# Patient Record
Sex: MALE | Race: WHITE
[De-identification: names, ages, dates, MRNs, and addresses within clinical notes are randomized per-mention and may not be internally consistent; named-entity substitution may affect disease eponyms.]

---

## 2019-11-11 ENCOUNTER — HOSPITAL ENCOUNTER (INPATIENT)
Dept: HOSPITAL 90 - EDH | Age: 74
LOS: 4 days | Discharge: HOSPICE | DRG: 871 | End: 2019-11-15
Attending: INTERNAL MEDICINE | Admitting: INTERNAL MEDICINE
Payer: OTHER GOVERNMENT

## 2019-11-11 VITALS — DIASTOLIC BLOOD PRESSURE: 44 MMHG | SYSTOLIC BLOOD PRESSURE: 79 MMHG

## 2019-11-11 VITALS — HEIGHT: 69 IN | BODY MASS INDEX: 28.88 KG/M2 | WEIGHT: 195 LBS

## 2019-11-11 VITALS — DIASTOLIC BLOOD PRESSURE: 47 MMHG | SYSTOLIC BLOOD PRESSURE: 81 MMHG

## 2019-11-11 VITALS — DIASTOLIC BLOOD PRESSURE: 49 MMHG | SYSTOLIC BLOOD PRESSURE: 88 MMHG

## 2019-11-11 DIAGNOSIS — J44.9: ICD-10-CM
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
## 2019-11-11 DIAGNOSIS — Z88.6: ICD-10-CM

## 2019-11-11 DIAGNOSIS — I10: ICD-10-CM

## 2019-11-11 DIAGNOSIS — E43: ICD-10-CM

## 2019-11-11 DIAGNOSIS — R65.21: ICD-10-CM

## 2019-11-11 DIAGNOSIS — A09: ICD-10-CM

## 2019-11-11 DIAGNOSIS — E11.9: ICD-10-CM

## 2019-11-11 DIAGNOSIS — C25.9: ICD-10-CM

## 2019-11-11 DIAGNOSIS — R50.81: ICD-10-CM

## 2019-11-11 DIAGNOSIS — E87.6: ICD-10-CM

## 2019-11-11 DIAGNOSIS — R62.7: ICD-10-CM

## 2019-11-11 DIAGNOSIS — T45.1X5A: ICD-10-CM

## 2019-11-11 DIAGNOSIS — A41.9: Primary | ICD-10-CM

## 2019-11-11 DIAGNOSIS — Z51.5: ICD-10-CM

## 2019-11-11 DIAGNOSIS — Z88.8: ICD-10-CM

## 2019-11-11 DIAGNOSIS — E78.5: ICD-10-CM

## 2019-11-11 DIAGNOSIS — E86.0: ICD-10-CM

## 2019-11-11 DIAGNOSIS — Z80.0: ICD-10-CM

## 2019-11-11 DIAGNOSIS — D64.81: ICD-10-CM

## 2019-11-11 DIAGNOSIS — K12.1: ICD-10-CM

## 2019-11-11 DIAGNOSIS — Z80.41: ICD-10-CM

## 2019-11-11 DIAGNOSIS — F32.9: ICD-10-CM

## 2019-11-11 DIAGNOSIS — E78.00: ICD-10-CM

## 2019-11-11 DIAGNOSIS — D69.59: ICD-10-CM

## 2019-11-11 DIAGNOSIS — Z82.49: ICD-10-CM

## 2019-11-11 DIAGNOSIS — E86.1: ICD-10-CM

## 2019-11-11 DIAGNOSIS — Z87.01: ICD-10-CM

## 2019-11-11 DIAGNOSIS — K92.2: ICD-10-CM

## 2019-11-11 DIAGNOSIS — Z87.891: ICD-10-CM

## 2019-11-11 DIAGNOSIS — D61.810: ICD-10-CM

## 2019-11-11 LAB
ALBUMIN SERPL-MCNC: 2.1 G/DL (ref 3.5–5)
ALT SERPL-CCNC: 110 U/L (ref 12–78)
APTT PPP: 44.7 SEC (ref 26.3–35.5)
AST SERPL-CCNC: 53 U/L (ref 10–37)
BASOPHILS NFR BLD AUTO: 0.3 % (ref 0–5)
BILIRUB DIRECT SERPL-MCNC: 0.6 MG/DL (ref 0–0.3)
BILIRUB SERPL-MCNC: 1.3 MG/DL (ref 0.2–1)
BUN SERPL-MCNC: 34 MG/DL (ref 7–18)
CHLORIDE SERPL-SCNC: 101 MMOL/L (ref 101–111)
CK SERPL-CCNC: 45 U/L (ref 21–232)
CO2 SERPL-SCNC: 28 MMOL/L (ref 21–32)
CREAT SERPL-MCNC: 1.1 MG/DL (ref 0.5–1.5)
DEPRECATED SQUAMOUS URNS QL MICRO: (no result) /HPF (ref 0–2)
EOSINOPHIL NFR BLD AUTO: 1.3 % (ref 0–8)
ERYTHROCYTE [DISTWIDTH] IN BLOOD BY AUTOMATED COUNT: 13 % (ref 11–15.5)
GFR SERPL CREATININE-BSD FRML MDRD: 70 ML/MIN (ref 60–?)
GLUCOSE SERPL-MCNC: 263 MG/DL (ref 70–105)
GLUCOSE UR STRIP-MCNC: (no result) MG/DL
HCT VFR BLD AUTO: 31.4 % (ref 42–54)
INR PPP: 1.44 (ref 0.85–1.15)
LIPASE SERPL-CCNC: < 50 U/L (ref 114–286)
LYMPHOCYTES NFR SPEC AUTO: 32.1 % (ref 21–51)
MCH RBC QN AUTO: 29.6 PG (ref 27–33)
MCHC RBC AUTO-ENTMCNC: 34 G/DL (ref 32–36)
MCV RBC AUTO: 87.3 FL (ref 79–99)
MONOCYTES NFR BLD AUTO: 4.6 % (ref 3–13)
NEUTROPHILS NFR BLD AUTO: 61.7 % (ref 40–77)
NRBC BLD MANUAL-RTO: 0.2 % (ref 0–0.19)
PH UR STRIP: 5.5 [PH] (ref 5–8)
PLAT MORPH BLD: (no result)
PLATELET # BLD AUTO: 104 K/UL (ref 130–400)
POTASSIUM SERPL-SCNC: 4.9 MMOL/L (ref 3.5–5.1)
PROT SERPL-MCNC: 5.8 G/DL (ref 6–8.3)
PROT UR QL STRIP: (no result) MG/DL
PROTHROMBIN TIME: 14.9 SEC (ref 9.6–11.6)
RBC # BLD AUTO: 3.59 MIL/UL (ref 4.5–6.2)
RBC #/AREA URNS HPF: (no result) /HPF (ref 0–1)
SODIUM SERPL-SCNC: 136 MMOL/L (ref 136–145)
SP GR UR STRIP: 1.03 (ref 1–1.03)
UROBILINOGEN UR STRIP-MCNC: 1 MG/DL (ref 0.2–1)
WBC # BLD AUTO: 0.3 K/UL (ref 4.8–10.8)
WBC #/AREA URNS HPF: (no result) /HPF (ref 0–1)

## 2019-11-11 PROCEDURE — 87040 BLOOD CULTURE FOR BACTERIA: CPT

## 2019-11-11 PROCEDURE — 83690 ASSAY OF LIPASE: CPT

## 2019-11-11 PROCEDURE — 87633 RESP VIRUS 12-25 TARGETS: CPT

## 2019-11-11 PROCEDURE — 85025 COMPLETE CBC W/AUTO DIFF WBC: CPT

## 2019-11-11 PROCEDURE — 87493 C DIFF AMPLIFIED PROBE: CPT

## 2019-11-11 PROCEDURE — 87581 M.PNEUMON DNA AMP PROBE: CPT

## 2019-11-11 PROCEDURE — 84145 PROCALCITONIN (PCT): CPT

## 2019-11-11 PROCEDURE — 93005 ELECTROCARDIOGRAM TRACING: CPT

## 2019-11-11 PROCEDURE — 87186 SC STD MICRODIL/AGAR DIL: CPT

## 2019-11-11 PROCEDURE — 83735 ASSAY OF MAGNESIUM: CPT

## 2019-11-11 PROCEDURE — 87077 CULTURE AEROBIC IDENTIFY: CPT

## 2019-11-11 PROCEDURE — 36415 COLL VENOUS BLD VENIPUNCTURE: CPT

## 2019-11-11 PROCEDURE — 81001 URINALYSIS AUTO W/SCOPE: CPT

## 2019-11-11 PROCEDURE — 85060 BLOOD SMEAR INTERPRETATION: CPT

## 2019-11-11 PROCEDURE — 80053 COMPREHEN METABOLIC PANEL: CPT

## 2019-11-11 PROCEDURE — 86901 BLOOD TYPING SEROLOGIC RH(D): CPT

## 2019-11-11 PROCEDURE — 85018 HEMOGLOBIN: CPT

## 2019-11-11 PROCEDURE — 71045 X-RAY EXAM CHEST 1 VIEW: CPT

## 2019-11-11 PROCEDURE — 82550 ASSAY OF CK (CPK): CPT

## 2019-11-11 PROCEDURE — 80076 HEPATIC FUNCTION PANEL: CPT

## 2019-11-11 PROCEDURE — 86900 BLOOD TYPING SEROLOGIC ABO: CPT

## 2019-11-11 PROCEDURE — 85610 PROTHROMBIN TIME: CPT

## 2019-11-11 PROCEDURE — 82270 OCCULT BLOOD FECES: CPT

## 2019-11-11 PROCEDURE — 85014 HEMATOCRIT: CPT

## 2019-11-11 PROCEDURE — 80048 BASIC METABOLIC PNL TOTAL CA: CPT

## 2019-11-11 PROCEDURE — 94640 AIRWAY INHALATION TREATMENT: CPT

## 2019-11-11 PROCEDURE — 85730 THROMBOPLASTIN TIME PARTIAL: CPT

## 2019-11-11 PROCEDURE — 84100 ASSAY OF PHOSPHORUS: CPT

## 2019-11-11 PROCEDURE — 86850 RBC ANTIBODY SCREEN: CPT

## 2019-11-11 PROCEDURE — 87046 STOOL CULTR AEROBIC BACT EA: CPT

## 2019-11-11 PROCEDURE — 84484 ASSAY OF TROPONIN QUANT: CPT

## 2019-11-11 PROCEDURE — 87798 DETECT AGENT NOS DNA AMP: CPT

## 2019-11-11 PROCEDURE — 87205 SMEAR GRAM STAIN: CPT

## 2019-11-11 PROCEDURE — 82948 REAGENT STRIP/BLOOD GLUCOSE: CPT

## 2019-11-11 PROCEDURE — 87486 CHLMYD PNEUM DNA AMP PROBE: CPT

## 2019-11-11 PROCEDURE — 87071 CULTURE AEROBIC QUANT OTHER: CPT

## 2019-11-11 RX ADMIN — TBO-FILGRASTIM SCH MCG: 480 INJECTION, SOLUTION SUBCUTANEOUS at 11:30

## 2019-11-11 RX ADMIN — SODIUM CHLORIDE SCH MLS/HR: 0.9 INJECTION, SOLUTION INTRAVENOUS at 21:20

## 2019-11-12 VITALS — DIASTOLIC BLOOD PRESSURE: 50 MMHG | SYSTOLIC BLOOD PRESSURE: 83 MMHG

## 2019-11-12 VITALS — SYSTOLIC BLOOD PRESSURE: 105 MMHG | DIASTOLIC BLOOD PRESSURE: 51 MMHG

## 2019-11-12 VITALS — DIASTOLIC BLOOD PRESSURE: 54 MMHG | SYSTOLIC BLOOD PRESSURE: 124 MMHG

## 2019-11-12 VITALS — SYSTOLIC BLOOD PRESSURE: 97 MMHG | DIASTOLIC BLOOD PRESSURE: 48 MMHG

## 2019-11-12 VITALS — DIASTOLIC BLOOD PRESSURE: 49 MMHG | SYSTOLIC BLOOD PRESSURE: 92 MMHG

## 2019-11-12 VITALS — SYSTOLIC BLOOD PRESSURE: 95 MMHG | DIASTOLIC BLOOD PRESSURE: 55 MMHG

## 2019-11-12 VITALS — DIASTOLIC BLOOD PRESSURE: 67 MMHG | SYSTOLIC BLOOD PRESSURE: 93 MMHG

## 2019-11-12 VITALS — DIASTOLIC BLOOD PRESSURE: 63 MMHG | SYSTOLIC BLOOD PRESSURE: 97 MMHG

## 2019-11-12 VITALS — DIASTOLIC BLOOD PRESSURE: 51 MMHG | SYSTOLIC BLOOD PRESSURE: 121 MMHG

## 2019-11-12 VITALS — DIASTOLIC BLOOD PRESSURE: 41 MMHG | SYSTOLIC BLOOD PRESSURE: 88 MMHG

## 2019-11-12 VITALS — SYSTOLIC BLOOD PRESSURE: 91 MMHG | DIASTOLIC BLOOD PRESSURE: 55 MMHG

## 2019-11-12 VITALS — DIASTOLIC BLOOD PRESSURE: 47 MMHG | SYSTOLIC BLOOD PRESSURE: 86 MMHG

## 2019-11-12 VITALS — DIASTOLIC BLOOD PRESSURE: 56 MMHG | SYSTOLIC BLOOD PRESSURE: 98 MMHG

## 2019-11-12 VITALS — DIASTOLIC BLOOD PRESSURE: 57 MMHG | SYSTOLIC BLOOD PRESSURE: 102 MMHG

## 2019-11-12 VITALS — SYSTOLIC BLOOD PRESSURE: 100 MMHG | DIASTOLIC BLOOD PRESSURE: 41 MMHG

## 2019-11-12 VITALS — SYSTOLIC BLOOD PRESSURE: 145 MMHG | DIASTOLIC BLOOD PRESSURE: 68 MMHG

## 2019-11-12 VITALS — SYSTOLIC BLOOD PRESSURE: 97 MMHG | DIASTOLIC BLOOD PRESSURE: 67 MMHG

## 2019-11-12 VITALS — SYSTOLIC BLOOD PRESSURE: 98 MMHG | DIASTOLIC BLOOD PRESSURE: 55 MMHG

## 2019-11-12 VITALS — DIASTOLIC BLOOD PRESSURE: 51 MMHG | SYSTOLIC BLOOD PRESSURE: 96 MMHG

## 2019-11-12 VITALS — DIASTOLIC BLOOD PRESSURE: 43 MMHG | SYSTOLIC BLOOD PRESSURE: 104 MMHG

## 2019-11-12 VITALS — DIASTOLIC BLOOD PRESSURE: 73 MMHG | SYSTOLIC BLOOD PRESSURE: 110 MMHG

## 2019-11-12 VITALS — SYSTOLIC BLOOD PRESSURE: 107 MMHG | DIASTOLIC BLOOD PRESSURE: 71 MMHG

## 2019-11-12 VITALS — DIASTOLIC BLOOD PRESSURE: 68 MMHG | SYSTOLIC BLOOD PRESSURE: 100 MMHG

## 2019-11-12 VITALS — DIASTOLIC BLOOD PRESSURE: 41 MMHG | SYSTOLIC BLOOD PRESSURE: 90 MMHG

## 2019-11-12 VITALS — SYSTOLIC BLOOD PRESSURE: 99 MMHG | DIASTOLIC BLOOD PRESSURE: 60 MMHG

## 2019-11-12 VITALS — SYSTOLIC BLOOD PRESSURE: 106 MMHG | DIASTOLIC BLOOD PRESSURE: 55 MMHG

## 2019-11-12 VITALS — DIASTOLIC BLOOD PRESSURE: 55 MMHG | SYSTOLIC BLOOD PRESSURE: 96 MMHG

## 2019-11-12 VITALS — DIASTOLIC BLOOD PRESSURE: 52 MMHG | SYSTOLIC BLOOD PRESSURE: 85 MMHG

## 2019-11-12 VITALS — SYSTOLIC BLOOD PRESSURE: 81 MMHG | DIASTOLIC BLOOD PRESSURE: 50 MMHG

## 2019-11-12 VITALS — SYSTOLIC BLOOD PRESSURE: 103 MMHG | DIASTOLIC BLOOD PRESSURE: 52 MMHG

## 2019-11-12 VITALS — SYSTOLIC BLOOD PRESSURE: 90 MMHG | DIASTOLIC BLOOD PRESSURE: 45 MMHG

## 2019-11-12 VITALS — DIASTOLIC BLOOD PRESSURE: 59 MMHG | SYSTOLIC BLOOD PRESSURE: 106 MMHG

## 2019-11-12 LAB — HCT VFR BLD AUTO: 29.5 % (ref 42–54)

## 2019-11-12 RX ADMIN — SODIUM CHLORIDE SCH MLS/HR: 0.9 INJECTION, SOLUTION INTRAVENOUS at 16:54

## 2019-11-12 RX ADMIN — PIPERACILLIN SODIUM AND TAZOBACTAM SODIUM SCH MLS/HR: .375; 3 INJECTION, POWDER, LYOPHILIZED, FOR SOLUTION INTRAVENOUS at 08:41

## 2019-11-12 RX ADMIN — PIPERACILLIN SODIUM AND TAZOBACTAM SODIUM SCH MLS/HR: .375; 3 INJECTION, POWDER, LYOPHILIZED, FOR SOLUTION INTRAVENOUS at 16:53

## 2019-11-12 RX ADMIN — TBO-FILGRASTIM SCH MCG: 480 INJECTION, SOLUTION SUBCUTANEOUS at 08:42

## 2019-11-12 RX ADMIN — SODIUM CHLORIDE SCH MG: 9 INJECTION, SOLUTION INTRAVENOUS at 20:14

## 2019-11-12 RX ADMIN — SODIUM CHLORIDE SCH MLS/HR: 0.9 INJECTION, SOLUTION INTRAVENOUS at 12:17

## 2019-11-12 RX ADMIN — SODIUM CHLORIDE SCH MLS/HR: 0.9 INJECTION, SOLUTION INTRAVENOUS at 12:16

## 2019-11-12 RX ADMIN — SODIUM CHLORIDE SCH MLS/HR: 0.9 INJECTION, SOLUTION INTRAVENOUS at 06:22

## 2019-11-12 RX ADMIN — PIPERACILLIN SODIUM AND TAZOBACTAM SODIUM SCH MLS/HR: .375; 3 INJECTION, POWDER, LYOPHILIZED, FOR SOLUTION INTRAVENOUS at 01:54

## 2019-11-12 RX ADMIN — PIPERACILLIN SODIUM AND TAZOBACTAM SODIUM SCH MLS/HR: .375; 3 INJECTION, POWDER, LYOPHILIZED, FOR SOLUTION INTRAVENOUS at 23:53

## 2019-11-12 RX ADMIN — SODIUM CHLORIDE SCH MLS/HR: 0.9 INJECTION, SOLUTION INTRAVENOUS at 22:18

## 2019-11-12 NOTE — NUR
DC Plan

Discussed dcp with patient and daughter Anu at bedside.  Patient deferred to daughter to 
answer questions.  States independent up to a few months ago.  Now has fallen in last 6 
months.  Known diagnosis of Stage IV Pancreatic CA.  Patient hiccuping during interview.  CM 
reported this to bedside nurse XAVIER who stated PMD pending to order thorazine.  Updated 
patient and daughter regarding this.  Case discussed with PMD.  States to follow oncologies 
recommendations.  Anticipate dcp home with or without hospice vs SNF/IRU.  CD

-------------------------------------------------------------------------------

Addendum: 11/12/19 at 1914 by VON COTO CM

-------------------------------------------------------------------------------

Amended: Links added.

## 2019-11-13 VITALS — DIASTOLIC BLOOD PRESSURE: 66 MMHG | SYSTOLIC BLOOD PRESSURE: 120 MMHG

## 2019-11-13 VITALS — SYSTOLIC BLOOD PRESSURE: 86 MMHG | DIASTOLIC BLOOD PRESSURE: 53 MMHG

## 2019-11-13 VITALS — DIASTOLIC BLOOD PRESSURE: 53 MMHG | SYSTOLIC BLOOD PRESSURE: 92 MMHG

## 2019-11-13 VITALS — SYSTOLIC BLOOD PRESSURE: 83 MMHG | DIASTOLIC BLOOD PRESSURE: 35 MMHG

## 2019-11-13 VITALS — SYSTOLIC BLOOD PRESSURE: 85 MMHG | DIASTOLIC BLOOD PRESSURE: 52 MMHG

## 2019-11-13 VITALS — SYSTOLIC BLOOD PRESSURE: 83 MMHG | DIASTOLIC BLOOD PRESSURE: 42 MMHG

## 2019-11-13 VITALS — SYSTOLIC BLOOD PRESSURE: 81 MMHG | DIASTOLIC BLOOD PRESSURE: 44 MMHG

## 2019-11-13 VITALS — DIASTOLIC BLOOD PRESSURE: 39 MMHG | SYSTOLIC BLOOD PRESSURE: 86 MMHG

## 2019-11-13 VITALS — DIASTOLIC BLOOD PRESSURE: 62 MMHG | SYSTOLIC BLOOD PRESSURE: 132 MMHG

## 2019-11-13 VITALS — SYSTOLIC BLOOD PRESSURE: 98 MMHG | DIASTOLIC BLOOD PRESSURE: 59 MMHG

## 2019-11-13 VITALS — DIASTOLIC BLOOD PRESSURE: 62 MMHG | SYSTOLIC BLOOD PRESSURE: 88 MMHG

## 2019-11-13 VITALS — SYSTOLIC BLOOD PRESSURE: 71 MMHG | DIASTOLIC BLOOD PRESSURE: 36 MMHG

## 2019-11-13 VITALS — DIASTOLIC BLOOD PRESSURE: 52 MMHG | SYSTOLIC BLOOD PRESSURE: 85 MMHG

## 2019-11-13 VITALS — DIASTOLIC BLOOD PRESSURE: 57 MMHG | SYSTOLIC BLOOD PRESSURE: 101 MMHG

## 2019-11-13 VITALS — DIASTOLIC BLOOD PRESSURE: 73 MMHG | SYSTOLIC BLOOD PRESSURE: 93 MMHG

## 2019-11-13 VITALS — SYSTOLIC BLOOD PRESSURE: 69 MMHG | DIASTOLIC BLOOD PRESSURE: 28 MMHG

## 2019-11-13 VITALS — DIASTOLIC BLOOD PRESSURE: 57 MMHG | SYSTOLIC BLOOD PRESSURE: 82 MMHG

## 2019-11-13 VITALS — SYSTOLIC BLOOD PRESSURE: 73 MMHG | DIASTOLIC BLOOD PRESSURE: 45 MMHG

## 2019-11-13 VITALS — DIASTOLIC BLOOD PRESSURE: 66 MMHG | SYSTOLIC BLOOD PRESSURE: 101 MMHG

## 2019-11-13 VITALS — DIASTOLIC BLOOD PRESSURE: 52 MMHG | SYSTOLIC BLOOD PRESSURE: 90 MMHG

## 2019-11-13 VITALS — SYSTOLIC BLOOD PRESSURE: 79 MMHG | DIASTOLIC BLOOD PRESSURE: 56 MMHG

## 2019-11-13 VITALS — SYSTOLIC BLOOD PRESSURE: 92 MMHG | DIASTOLIC BLOOD PRESSURE: 52 MMHG

## 2019-11-13 VITALS — SYSTOLIC BLOOD PRESSURE: 70 MMHG | DIASTOLIC BLOOD PRESSURE: 32 MMHG

## 2019-11-13 VITALS — SYSTOLIC BLOOD PRESSURE: 96 MMHG | DIASTOLIC BLOOD PRESSURE: 51 MMHG

## 2019-11-13 VITALS — DIASTOLIC BLOOD PRESSURE: 51 MMHG | SYSTOLIC BLOOD PRESSURE: 86 MMHG

## 2019-11-13 VITALS — DIASTOLIC BLOOD PRESSURE: 49 MMHG | SYSTOLIC BLOOD PRESSURE: 102 MMHG

## 2019-11-13 VITALS — DIASTOLIC BLOOD PRESSURE: 50 MMHG | SYSTOLIC BLOOD PRESSURE: 91 MMHG

## 2019-11-13 VITALS — DIASTOLIC BLOOD PRESSURE: 44 MMHG | SYSTOLIC BLOOD PRESSURE: 97 MMHG

## 2019-11-13 VITALS — DIASTOLIC BLOOD PRESSURE: 54 MMHG | SYSTOLIC BLOOD PRESSURE: 103 MMHG

## 2019-11-13 VITALS — SYSTOLIC BLOOD PRESSURE: 91 MMHG | DIASTOLIC BLOOD PRESSURE: 48 MMHG

## 2019-11-13 VITALS — DIASTOLIC BLOOD PRESSURE: 61 MMHG | SYSTOLIC BLOOD PRESSURE: 91 MMHG

## 2019-11-13 VITALS — SYSTOLIC BLOOD PRESSURE: 88 MMHG | DIASTOLIC BLOOD PRESSURE: 44 MMHG

## 2019-11-13 VITALS — DIASTOLIC BLOOD PRESSURE: 42 MMHG | SYSTOLIC BLOOD PRESSURE: 75 MMHG

## 2019-11-13 VITALS — DIASTOLIC BLOOD PRESSURE: 53 MMHG | SYSTOLIC BLOOD PRESSURE: 84 MMHG

## 2019-11-13 VITALS — DIASTOLIC BLOOD PRESSURE: 57 MMHG | SYSTOLIC BLOOD PRESSURE: 84 MMHG

## 2019-11-13 VITALS — DIASTOLIC BLOOD PRESSURE: 48 MMHG | SYSTOLIC BLOOD PRESSURE: 83 MMHG

## 2019-11-13 VITALS — DIASTOLIC BLOOD PRESSURE: 54 MMHG | SYSTOLIC BLOOD PRESSURE: 91 MMHG

## 2019-11-13 VITALS — DIASTOLIC BLOOD PRESSURE: 62 MMHG | SYSTOLIC BLOOD PRESSURE: 103 MMHG

## 2019-11-13 VITALS — DIASTOLIC BLOOD PRESSURE: 54 MMHG | SYSTOLIC BLOOD PRESSURE: 81 MMHG

## 2019-11-13 VITALS — SYSTOLIC BLOOD PRESSURE: 105 MMHG | DIASTOLIC BLOOD PRESSURE: 56 MMHG

## 2019-11-13 VITALS — SYSTOLIC BLOOD PRESSURE: 85 MMHG | DIASTOLIC BLOOD PRESSURE: 51 MMHG

## 2019-11-13 LAB
ALBUMIN SERPL-MCNC: 1.5 G/DL (ref 3.5–5)
ALT SERPL-CCNC: 57 U/L (ref 12–78)
APTT PPP: 47.5 SEC (ref 26.3–35.5)
AST SERPL-CCNC: 24 U/L (ref 10–37)
BASOPHILS NFR BLD AUTO: 0.1 % (ref 0–5)
BILIRUB SERPL-MCNC: 0.7 MG/DL (ref 0.2–1)
BUN SERPL-MCNC: 32 MG/DL (ref 7–18)
CHLORIDE SERPL-SCNC: 109 MMOL/L (ref 101–111)
CO2 SERPL-SCNC: 22 MMOL/L (ref 21–32)
CREAT SERPL-MCNC: 0.9 MG/DL (ref 0.5–1.5)
EOSINOPHIL NFR BLD AUTO: 0.1 % (ref 0–8)
ERYTHROCYTE [DISTWIDTH] IN BLOOD BY AUTOMATED COUNT: 13 % (ref 11–15.5)
GFR SERPL CREATININE-BSD FRML MDRD: 88 ML/MIN (ref 60–?)
GLUCOSE SERPL-MCNC: 212 MG/DL (ref 70–105)
HCT VFR BLD AUTO: 27.3 % (ref 42–54)
INR PPP: 1.33 (ref 0.85–1.15)
LYMPHOCYTES NFR SPEC AUTO: 21.5 % (ref 21–51)
MAGNESIUM SERPL-MCNC: 1.9 MG/DL (ref 1.8–2.4)
MCH RBC QN AUTO: 30.1 PG (ref 27–33)
MCHC RBC AUTO-ENTMCNC: 34.2 G/DL (ref 32–36)
MCV RBC AUTO: 88.1 FL (ref 79–99)
MONOCYTES NFR BLD AUTO: 3.9 % (ref 3–13)
NEUTROPHILS NFR BLD AUTO: 74.4 % (ref 40–77)
NRBC BLD MANUAL-RTO: 1.4 % (ref 0–0.19)
PHOSPHATE SERPL-MCNC: 1.8 MG/DL (ref 2.5–4.9)
PLATELET # BLD AUTO: 94 K/UL (ref 130–400)
POTASSIUM SERPL-SCNC: 3.5 MMOL/L (ref 3.5–5.1)
PROT SERPL-MCNC: 4.7 G/DL (ref 6–8.3)
PROTHROMBIN TIME: 13.8 SEC (ref 9.6–11.6)
RBC # BLD AUTO: 3.1 MIL/UL (ref 4.5–6.2)
SODIUM SERPL-SCNC: 143 MMOL/L (ref 136–145)
WBC # BLD AUTO: 0.5 K/UL (ref 4.8–10.8)

## 2019-11-13 PROCEDURE — 02HV33Z INSERTION OF INFUSION DEVICE INTO SUPERIOR VENA CAVA, PERCUTANEOUS APPROACH: ICD-10-PCS | Performed by: INTERNAL MEDICINE

## 2019-11-13 RX ADMIN — Medication SCH MLS/HR: at 16:20

## 2019-11-13 RX ADMIN — PIPERACILLIN SODIUM AND TAZOBACTAM SODIUM SCH MLS/HR: .375; 3 INJECTION, POWDER, LYOPHILIZED, FOR SOLUTION INTRAVENOUS at 08:19

## 2019-11-13 RX ADMIN — PIPERACILLIN SODIUM AND TAZOBACTAM SODIUM SCH MLS/HR: .375; 3 INJECTION, POWDER, LYOPHILIZED, FOR SOLUTION INTRAVENOUS at 18:22

## 2019-11-13 RX ADMIN — SODIUM CHLORIDE SCH MG: 9 INJECTION, SOLUTION INTRAVENOUS at 20:13

## 2019-11-13 RX ADMIN — HYDROMORPHONE HYDROCHLORIDE PRN MG: 1 INJECTION, SOLUTION INTRAMUSCULAR; INTRAVENOUS; SUBCUTANEOUS at 19:09

## 2019-11-13 RX ADMIN — SODIUM CHLORIDE SCH MLS/HR: 0.9 INJECTION, SOLUTION INTRAVENOUS at 23:41

## 2019-11-13 RX ADMIN — SODIUM CHLORIDE SCH MLS/HR: 0.9 INJECTION, SOLUTION INTRAVENOUS at 00:35

## 2019-11-13 RX ADMIN — FLUCONAZOLE IN SODIUM CHLORIDE SCH MLS/HR: 2 INJECTION, SOLUTION INTRAVENOUS at 16:00

## 2019-11-13 RX ADMIN — SODIUM CHLORIDE SCH MG: 9 INJECTION, SOLUTION INTRAVENOUS at 08:05

## 2019-11-13 RX ADMIN — SODIUM CHLORIDE SCH MLS/HR: 0.9 INJECTION, SOLUTION INTRAVENOUS at 20:12

## 2019-11-13 RX ADMIN — SODIUM CHLORIDE SCH MLS/HR: 0.9 INJECTION, SOLUTION INTRAVENOUS at 16:00

## 2019-11-13 RX ADMIN — SODIUM CHLORIDE SCH MLS/HR: 9 INJECTION, SOLUTION INTRAVENOUS at 20:14

## 2019-11-13 RX ADMIN — SODIUM CHLORIDE SCH MLS/HR: 0.9 INJECTION, SOLUTION INTRAVENOUS at 08:05

## 2019-11-13 RX ADMIN — METHYLPREDNISOLONE SODIUM SUCCINATE SCH MG: 40 INJECTION, POWDER, FOR SOLUTION INTRAMUSCULAR; INTRAVENOUS at 16:17

## 2019-11-13 RX ADMIN — Medication SCH MLS/HR: at 23:44

## 2019-11-13 RX ADMIN — Medication SCH MLS/HR: at 06:50

## 2019-11-13 RX ADMIN — TBO-FILGRASTIM SCH MCG: 480 INJECTION, SOLUTION SUBCUTANEOUS at 08:18

## 2019-11-13 RX ADMIN — Medication SCH MLS/HR: at 20:39

## 2019-11-13 RX ADMIN — SODIUM CHLORIDE SCH MLS/HR: 0.9 INJECTION, SOLUTION INTRAVENOUS at 08:10

## 2019-11-13 NOTE — NUR
Called pharmacy: Spoke to Nena. Made aware of Pending Vanco, K-Phos, and Requested another 
bag of Levophed 4mg/250ml. Pending medications to be brought at this time.

## 2019-11-13 NOTE — NUR
PICC LINE INSERTION



PICC LINE NURSE HERE TO PLACE PICC LINE. CONSENT COMPLETED C/ WIFE, C/ BOTH PICC LINE NURSE 
AND PRIMARY NURSE PRESENT. FAMILY ASKED TO WAIT APPROXIMATELY 1HR FOR INSERTION AND 
PLACEMENT VERIFICATION C/ CXR 1VIEW. WILL MAKE FAMILY AWARE ONCE PICC LINE INSERTION IS DONE 
AND PLACEMENT IS CONFIRMED.

## 2019-11-13 NOTE — NUR
Janet NP here to see patient. Made aware of Hypotension, and Sinus Tach/SVT episodes. Also, 
requested cortisol level in an effort to see if steroids is beneficial at this time. Stated 
she feels steroids is appropriate at this time and will place order for steroids. Also, she 
spoke c/ Wife and Son at bedside in an effort to give them a full idea of what is going 
on/Presentation of patient. Family is contemplating withdrawing pressors. However, all 
Family will be here tomorrow and will address it with them. Pending for family to give ok to 
withdraw care at this time.

## 2019-11-13 NOTE — NUR
Dr. Feliz came to see patient. Made aware of status on Levophed increasing from PM 
shift. NS bolus's X3. Liquid BM's c/ Neg C-Diff. Also, made aware of possible withdrawl of 
care tomorrow post family talk.-As per Wife. NO changes in [place] at this time.

## 2019-11-13 NOTE — NUR
Patient straining to use urinal. Went into 's sustained. Call placed Hospitalist on 
call [place]. Patient converted back to sinus rate 90's to low 100's. Patient 
talking, moving all extremities. No c/o pain, sob. No new orders at this time

## 2019-11-13 NOTE — NUR
Dr. Pace here to see patient. Made aware of 2,250ml bolus over PM shift. Stated to give 
another 2L bolus. Made aware of patient DNR status as well as Levophed on board.

## 2019-11-13 NOTE — NUR
Wound Photo Taken



Patient in brief. Cleaned patient, applied Allivyn to coccyx, and sacrum. Stage II cleaned, 
and covered c/ Allivyn. Photo in chart. Family made aware.

## 2019-11-13 NOTE — NUR
GAVIN ESTEVEZ NP PATIENT WITH ELEVATED  -EKG CONFIRMED A FIB RVR, BP 90/52ON LEVOPHED @ 16 
MCG/MIN. NEW ORDER FOR CARDIZEM 30 MG PO X 1.

## 2019-11-13 NOTE — NUR
Pain Management



Wife asked if I can give pain medication to patient. Patient asked if he was in pain. 
Patient stated no pain at this time. Will evaluate again at a later time.

## 2019-11-14 VITALS — DIASTOLIC BLOOD PRESSURE: 75 MMHG | SYSTOLIC BLOOD PRESSURE: 108 MMHG

## 2019-11-14 VITALS — SYSTOLIC BLOOD PRESSURE: 101 MMHG | DIASTOLIC BLOOD PRESSURE: 62 MMHG

## 2019-11-14 VITALS — DIASTOLIC BLOOD PRESSURE: 65 MMHG | SYSTOLIC BLOOD PRESSURE: 99 MMHG

## 2019-11-14 VITALS — SYSTOLIC BLOOD PRESSURE: 108 MMHG | DIASTOLIC BLOOD PRESSURE: 39 MMHG

## 2019-11-14 VITALS — DIASTOLIC BLOOD PRESSURE: 65 MMHG | SYSTOLIC BLOOD PRESSURE: 96 MMHG

## 2019-11-14 VITALS — DIASTOLIC BLOOD PRESSURE: 68 MMHG | SYSTOLIC BLOOD PRESSURE: 121 MMHG

## 2019-11-14 VITALS — SYSTOLIC BLOOD PRESSURE: 112 MMHG | DIASTOLIC BLOOD PRESSURE: 83 MMHG

## 2019-11-14 VITALS — DIASTOLIC BLOOD PRESSURE: 53 MMHG | SYSTOLIC BLOOD PRESSURE: 92 MMHG

## 2019-11-14 VITALS — SYSTOLIC BLOOD PRESSURE: 143 MMHG | DIASTOLIC BLOOD PRESSURE: 71 MMHG

## 2019-11-14 VITALS — SYSTOLIC BLOOD PRESSURE: 96 MMHG | DIASTOLIC BLOOD PRESSURE: 59 MMHG

## 2019-11-14 VITALS — DIASTOLIC BLOOD PRESSURE: 67 MMHG | SYSTOLIC BLOOD PRESSURE: 106 MMHG

## 2019-11-14 VITALS — DIASTOLIC BLOOD PRESSURE: 70 MMHG | SYSTOLIC BLOOD PRESSURE: 122 MMHG

## 2019-11-14 VITALS — SYSTOLIC BLOOD PRESSURE: 118 MMHG | DIASTOLIC BLOOD PRESSURE: 67 MMHG

## 2019-11-14 VITALS — SYSTOLIC BLOOD PRESSURE: 104 MMHG | DIASTOLIC BLOOD PRESSURE: 63 MMHG

## 2019-11-14 VITALS — SYSTOLIC BLOOD PRESSURE: 93 MMHG | DIASTOLIC BLOOD PRESSURE: 56 MMHG

## 2019-11-14 VITALS — DIASTOLIC BLOOD PRESSURE: 76 MMHG | SYSTOLIC BLOOD PRESSURE: 109 MMHG

## 2019-11-14 VITALS — DIASTOLIC BLOOD PRESSURE: 103 MMHG | SYSTOLIC BLOOD PRESSURE: 194 MMHG

## 2019-11-14 VITALS — DIASTOLIC BLOOD PRESSURE: 83 MMHG | SYSTOLIC BLOOD PRESSURE: 136 MMHG

## 2019-11-14 VITALS — DIASTOLIC BLOOD PRESSURE: 61 MMHG | SYSTOLIC BLOOD PRESSURE: 95 MMHG

## 2019-11-14 VITALS — DIASTOLIC BLOOD PRESSURE: 55 MMHG | SYSTOLIC BLOOD PRESSURE: 98 MMHG

## 2019-11-14 VITALS — SYSTOLIC BLOOD PRESSURE: 137 MMHG | DIASTOLIC BLOOD PRESSURE: 66 MMHG

## 2019-11-14 VITALS — SYSTOLIC BLOOD PRESSURE: 108 MMHG | DIASTOLIC BLOOD PRESSURE: 60 MMHG

## 2019-11-14 VITALS — DIASTOLIC BLOOD PRESSURE: 86 MMHG | SYSTOLIC BLOOD PRESSURE: 136 MMHG

## 2019-11-14 VITALS — DIASTOLIC BLOOD PRESSURE: 51 MMHG | SYSTOLIC BLOOD PRESSURE: 107 MMHG

## 2019-11-14 VITALS — SYSTOLIC BLOOD PRESSURE: 94 MMHG | DIASTOLIC BLOOD PRESSURE: 55 MMHG

## 2019-11-14 VITALS — SYSTOLIC BLOOD PRESSURE: 95 MMHG | DIASTOLIC BLOOD PRESSURE: 65 MMHG

## 2019-11-14 VITALS — DIASTOLIC BLOOD PRESSURE: 67 MMHG | SYSTOLIC BLOOD PRESSURE: 114 MMHG

## 2019-11-14 VITALS — SYSTOLIC BLOOD PRESSURE: 120 MMHG | DIASTOLIC BLOOD PRESSURE: 64 MMHG

## 2019-11-14 VITALS — SYSTOLIC BLOOD PRESSURE: 118 MMHG | DIASTOLIC BLOOD PRESSURE: 78 MMHG

## 2019-11-14 VITALS — DIASTOLIC BLOOD PRESSURE: 53 MMHG | SYSTOLIC BLOOD PRESSURE: 79 MMHG

## 2019-11-14 VITALS — DIASTOLIC BLOOD PRESSURE: 99 MMHG | SYSTOLIC BLOOD PRESSURE: 137 MMHG

## 2019-11-14 VITALS — SYSTOLIC BLOOD PRESSURE: 114 MMHG | DIASTOLIC BLOOD PRESSURE: 58 MMHG

## 2019-11-14 VITALS — SYSTOLIC BLOOD PRESSURE: 102 MMHG | DIASTOLIC BLOOD PRESSURE: 69 MMHG

## 2019-11-14 VITALS — SYSTOLIC BLOOD PRESSURE: 123 MMHG | DIASTOLIC BLOOD PRESSURE: 68 MMHG

## 2019-11-14 VITALS — SYSTOLIC BLOOD PRESSURE: 110 MMHG | DIASTOLIC BLOOD PRESSURE: 62 MMHG

## 2019-11-14 VITALS — SYSTOLIC BLOOD PRESSURE: 106 MMHG | DIASTOLIC BLOOD PRESSURE: 53 MMHG

## 2019-11-14 VITALS — DIASTOLIC BLOOD PRESSURE: 77 MMHG | SYSTOLIC BLOOD PRESSURE: 126 MMHG

## 2019-11-14 VITALS — DIASTOLIC BLOOD PRESSURE: 71 MMHG | SYSTOLIC BLOOD PRESSURE: 123 MMHG

## 2019-11-14 VITALS — DIASTOLIC BLOOD PRESSURE: 63 MMHG | SYSTOLIC BLOOD PRESSURE: 115 MMHG

## 2019-11-14 VITALS — DIASTOLIC BLOOD PRESSURE: 57 MMHG | SYSTOLIC BLOOD PRESSURE: 93 MMHG

## 2019-11-14 LAB
BASOPHILS NFR BLD AUTO: 0.1 % (ref 0–5)
BUN SERPL-MCNC: 32 MG/DL (ref 7–18)
CHLORIDE SERPL-SCNC: 114 MMOL/L (ref 101–111)
CO2 SERPL-SCNC: 15 MMOL/L (ref 21–32)
CREAT SERPL-MCNC: 0.9 MG/DL (ref 0.5–1.5)
EOSINOPHIL NFR BLD AUTO: 0 % (ref 0–8)
ERYTHROCYTE [DISTWIDTH] IN BLOOD BY AUTOMATED COUNT: 13.9 % (ref 11–15.5)
GFR SERPL CREATININE-BSD FRML MDRD: 88 ML/MIN (ref 60–?)
GLUCOSE SERPL-MCNC: 255 MG/DL (ref 70–105)
HCT VFR BLD AUTO: 29.2 % (ref 42–54)
LYMPHOCYTES NFR SPEC AUTO: 7.5 % (ref 21–51)
MCH RBC QN AUTO: 29.5 PG (ref 27–33)
MCHC RBC AUTO-ENTMCNC: 33.7 G/DL (ref 32–36)
MCV RBC AUTO: 87.6 FL (ref 79–99)
MONOCYTES NFR BLD AUTO: 3 % (ref 3–13)
NEUTROPHILS NFR BLD AUTO: 89.4 % (ref 40–77)
NRBC BLD MANUAL-RTO: 0 % (ref 0–0.19)
PLATELET # BLD AUTO: 109 K/UL (ref 130–400)
POTASSIUM SERPL-SCNC: 3.8 MMOL/L (ref 3.5–5.1)
RBC # BLD AUTO: 3.34 MIL/UL (ref 4.5–6.2)
SODIUM SERPL-SCNC: 145 MMOL/L (ref 136–145)
WBC # BLD AUTO: 1.4 K/UL (ref 4.8–10.8)

## 2019-11-14 RX ADMIN — Medication SCH MLS/HR: at 15:30

## 2019-11-14 RX ADMIN — SODIUM CHLORIDE SCH MLS/HR: 9 INJECTION, SOLUTION INTRAVENOUS at 08:24

## 2019-11-14 RX ADMIN — Medication SCH MLS/HR: at 10:28

## 2019-11-14 RX ADMIN — TBO-FILGRASTIM SCH MCG: 480 INJECTION, SOLUTION SUBCUTANEOUS at 08:22

## 2019-11-14 RX ADMIN — LORAZEPAM PRN MG: 2 INJECTION INTRAMUSCULAR; INTRAVENOUS at 23:06

## 2019-11-14 RX ADMIN — HYDROMORPHONE HYDROCHLORIDE PRN MG: 1 INJECTION, SOLUTION INTRAMUSCULAR; INTRAVENOUS; SUBCUTANEOUS at 03:53

## 2019-11-14 RX ADMIN — MORPHINE SULFATE PRN MG: 2 INJECTION, SOLUTION INTRAMUSCULAR; INTRAVENOUS at 16:20

## 2019-11-14 RX ADMIN — SODIUM CHLORIDE SCH MLS/HR: 0.9 INJECTION, SOLUTION INTRAVENOUS at 13:37

## 2019-11-14 RX ADMIN — LORAZEPAM PRN MG: 2 INJECTION INTRAMUSCULAR; INTRAVENOUS at 16:20

## 2019-11-14 RX ADMIN — PIPERACILLIN SODIUM AND TAZOBACTAM SODIUM SCH MLS/HR: .375; 3 INJECTION, POWDER, LYOPHILIZED, FOR SOLUTION INTRAVENOUS at 08:24

## 2019-11-14 RX ADMIN — HYDROMORPHONE HYDROCHLORIDE PRN MG: 1 INJECTION, SOLUTION INTRAMUSCULAR; INTRAVENOUS; SUBCUTANEOUS at 17:15

## 2019-11-14 RX ADMIN — HYDROMORPHONE HYDROCHLORIDE PRN MG: 1 INJECTION, SOLUTION INTRAMUSCULAR; INTRAVENOUS; SUBCUTANEOUS at 08:10

## 2019-11-14 RX ADMIN — METHYLPREDNISOLONE SODIUM SUCCINATE SCH MG: 40 INJECTION, POWDER, FOR SOLUTION INTRAMUSCULAR; INTRAVENOUS at 08:18

## 2019-11-14 RX ADMIN — SODIUM CHLORIDE SCH MLS/HR: 0.9 INJECTION, SOLUTION INTRAVENOUS at 12:36

## 2019-11-14 RX ADMIN — Medication SCH MLS/HR: at 05:12

## 2019-11-14 RX ADMIN — LORAZEPAM PRN MG: 2 INJECTION INTRAMUSCULAR; INTRAVENOUS at 20:47

## 2019-11-14 RX ADMIN — MORPHINE SULFATE PRN MG: 2 INJECTION, SOLUTION INTRAMUSCULAR; INTRAVENOUS at 13:37

## 2019-11-14 RX ADMIN — FLUCONAZOLE IN SODIUM CHLORIDE SCH MLS/HR: 2 INJECTION, SOLUTION INTRAVENOUS at 12:36

## 2019-11-14 RX ADMIN — SODIUM CHLORIDE SCH MG: 9 INJECTION, SOLUTION INTRAVENOUS at 08:18

## 2019-11-14 RX ADMIN — METHYLPREDNISOLONE SODIUM SUCCINATE SCH MG: 40 INJECTION, POWDER, FOR SOLUTION INTRAMUSCULAR; INTRAVENOUS at 01:15

## 2019-11-14 RX ADMIN — PIPERACILLIN SODIUM AND TAZOBACTAM SODIUM SCH MLS/HR: .375; 3 INJECTION, POWDER, LYOPHILIZED, FOR SOLUTION INTRAVENOUS at 01:15

## 2019-11-14 NOTE — NUR
Patient Verbalized Consent to remove Medications/Measures. 



Patient stated to me and wife in room at bedside that he wanted to stop all life supporting 
medication/measures and was ready to go.

## 2019-11-14 NOTE — NUR
Withdrawal of Life Support Consent



Wife signed c/ Primary Nurse. She stated ok to remove Levophed. Made aware that this 
medication was on board to support blood pressure. Stated ok to remove medication.

## 2019-11-14 NOTE — NUR
EMOTIONAL SUPPORT



Sw met with pt's family. Per Daughter in law, family is waiting for 1 last grand daughter to 
arrive, and she is 200 miles away still. Family states plan is to stop medication and start 
comfort measures. per family, doctors do not feel pt will survive very long after 
medications are stopped. Sw educated on inpt hospice. Plan is to wait 24 hrs after meds are 
stopped and see if pt survives. If he does, Sw to begin inpt hospice referral. Sw to f/u and 
assist as needed

## 2019-11-15 VITALS — SYSTOLIC BLOOD PRESSURE: 115 MMHG | DIASTOLIC BLOOD PRESSURE: 61 MMHG

## 2019-11-15 VITALS — DIASTOLIC BLOOD PRESSURE: 57 MMHG | SYSTOLIC BLOOD PRESSURE: 94 MMHG

## 2019-11-15 VITALS — DIASTOLIC BLOOD PRESSURE: 59 MMHG | SYSTOLIC BLOOD PRESSURE: 110 MMHG

## 2019-11-15 VITALS — SYSTOLIC BLOOD PRESSURE: 110 MMHG | DIASTOLIC BLOOD PRESSURE: 67 MMHG

## 2019-11-15 VITALS — SYSTOLIC BLOOD PRESSURE: 109 MMHG | DIASTOLIC BLOOD PRESSURE: 71 MMHG

## 2019-11-15 RX ADMIN — LORAZEPAM PRN MG: 2 INJECTION INTRAMUSCULAR; INTRAVENOUS at 08:22

## 2019-11-15 RX ADMIN — LORAZEPAM PRN MG: 2 INJECTION INTRAMUSCULAR; INTRAVENOUS at 02:12

## 2019-11-15 RX ADMIN — LORAZEPAM PRN MG: 2 INJECTION INTRAMUSCULAR; INTRAVENOUS at 04:06

## 2019-11-15 RX ADMIN — LORAZEPAM PRN MG: 2 INJECTION INTRAMUSCULAR; INTRAVENOUS at 06:08

## 2019-11-15 RX ADMIN — LORAZEPAM PRN MG: 2 INJECTION INTRAMUSCULAR; INTRAVENOUS at 10:22

## 2019-11-15 NOTE — NUR
NOTE

PATIENT ARRIVED FROM SECOND FLOOD VIA BED. HE IS RESTING AND CALM. GOT RESTLESS FOR A FEW 
MINUTES AFTER TRANSFERRING BED AND CALMED DOWN AFTER A FEW MINUTES.  NO FAMILY AT THIS TIME 
HERE WITH HIM. HE HAS A DIAPER AND WAS NOTIFIED THAT HE HAD A BM  AND WAS CHANGED JUST PRIOR 
TO COMING OVER. PICC LINE  ON RIGHT ARM WITH PCA MORPHINE CONTINUOUS INFUSION 2ML/HR.  
DRESSING CLEAN AND INTACT.

## 2019-11-15 NOTE — NUR
INPT Hospice



Reggie met with family and educated on In hospice. Family is agreeable to referral for eval 
and admit if accepted. Reggie called Elaine at [facility] and made referral. Pt info faxed to 
office



Elaine is here calling to family and Kassidy GARNER is on her way to eval.

## 2019-11-15 NOTE — NUR
PATIENT TRANSFERRED TO ROOM 326. FAMILY AT BEDSIDE AWARE OF TRANSFER. REPORT GIVEN TO PASCUAL KENNEDY. PATIENT CALM AND COMFORTABLE VS STABLE.